# Patient Record
Sex: FEMALE | Race: BLACK OR AFRICAN AMERICAN | NOT HISPANIC OR LATINO | URBAN - METROPOLITAN AREA
[De-identification: names, ages, dates, MRNs, and addresses within clinical notes are randomized per-mention and may not be internally consistent; named-entity substitution may affect disease eponyms.]

---

## 2018-05-17 ENCOUNTER — EMERGENCY (EMERGENCY)
Facility: HOSPITAL | Age: 28
LOS: 1 days | Discharge: HOME | End: 2018-05-17
Attending: EMERGENCY MEDICINE | Admitting: EMERGENCY MEDICINE
Payer: COMMERCIAL

## 2018-05-17 VITALS
RESPIRATION RATE: 16 BRPM | OXYGEN SATURATION: 100 % | TEMPERATURE: 99 F | WEIGHT: 154.98 LBS | DIASTOLIC BLOOD PRESSURE: 94 MMHG | HEART RATE: 103 BPM | HEIGHT: 62 IN | SYSTOLIC BLOOD PRESSURE: 116 MMHG

## 2018-05-17 VITALS
SYSTOLIC BLOOD PRESSURE: 122 MMHG | OXYGEN SATURATION: 100 % | TEMPERATURE: 98 F | RESPIRATION RATE: 16 BRPM | HEART RATE: 88 BPM

## 2018-05-17 PROCEDURE — 70486 CT MAXILLOFACIAL W/O DYE: CPT | Mod: 26

## 2018-05-17 PROCEDURE — 99284 EMERGENCY DEPT VISIT MOD MDM: CPT

## 2018-05-17 PROCEDURE — 99285 EMERGENCY DEPT VISIT HI MDM: CPT | Mod: 25

## 2018-05-17 PROCEDURE — 70486 CT MAXILLOFACIAL W/O DYE: CPT

## 2018-05-17 RX ORDER — IBUPROFEN 200 MG
600 TABLET ORAL ONCE
Qty: 0 | Refills: 0 | Status: COMPLETED | OUTPATIENT
Start: 2018-05-17 | End: 2018-05-17

## 2018-05-17 RX ADMIN — Medication 600 MILLIGRAM(S): at 19:59

## 2018-05-17 RX ADMIN — Medication 600 MILLIGRAM(S): at 20:15
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
## 2018-05-17 NOTE — ED PROVIDER NOTE - SKIN, MLM
Skin normal color for race, warm, dry. Indurated area over lateral portion of L upper arm and small scratch to R hand.

## 2018-05-17 NOTE — ED PROVIDER NOTE - NS_ ATTENDINGSCRIBEDETAILS _ED_A_ED_FT
Amadeo Castelan MD - The scribe's documentation has been prepared under my direction and personally reviewed by me in its entirety. I confirm that the note above accurately reflects all work, treatment, procedures, and medical decision making performed by me.

## 2018-05-17 NOTE — ED PROVIDER NOTE - ENMT, MLM
Airway patent, Nasal mucosa clear. Mouth with normal mucosa. Throat has no vesicles, no oropharyngeal exudates and uvula is midline.  Facial exam: Bilateral erythema and swelling to maxilla with + ttp. + ttp bilateral TMJ, FROM intact.
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
## 2018-05-17 NOTE — ED ADULT NURSE REASSESSMENT NOTE - NS ED NURSE REASSESS COMMENT FT1
Pt referred to Nursing Supervisor Ade and FAIZAN Prieto. Pt reported will go home  safely with her Mom's sister house
Pt seen and examined by Dr Castelan

## 2018-05-18 RX ORDER — ETONOGESTREL 68 MG/1
1 IMPLANT SUBCUTANEOUS
Qty: 0 | Refills: 0 | COMMUNITY

## 2019-09-12 NOTE — ED PROVIDER NOTE - HISTORY ATTESTATION, MLM
May put as much weight as you can tolerate on operative leg unless told otherwise    CPM 3-4 hours per day, increase ROM as tolerated (usually increase by 5 degrees at a time)    Incision may get wet, uncovered in shower, as of post operative day 2; NO BATHS    Incision may be open to the air, unless there is some drainage, then use a dry dressing and change daily or more if needed    TEDS (white stockings) should be worn for a total of 2 weeks, they can however, be removed at night    You should have a follow up appointment about 2 weeks after surgery. At this visit your sutures/staples will be removed. Please take pain medication prior to this appointment. If you don't have an appointment please call the clinic to make one    Aspirin 325 mg twice daily for 6 weeks, for total of 650 mg daily    Participate in therapy    Ice to surgical knee, 20 minutes 3-4 times per day; may put heat behind knee for muscle spasms and to help with stretching exercises    If you need a refill of your pain medication, please call before you completely run out and prior to the weekend. NO narcotics will be filled on the weekend    You will need to take antibiotics one hour prior to any dental work, for the rest of your life    Contact numbers:  [place]  [phone]  [place]  [phone]  [place]  [phone]  Call Service  (24 hours per day) [phone]    HOLD METOPROLOL UNTIL YOU ARE SEN BY BERNY FLORES, YOUR PCP     I have reviewed and confirmed nurses' notes...

## 2023-01-01 NOTE — ED PROVIDER NOTE - NS_EDPROVIDERDISPOUSERTYPE_ED_A_ED
Scribe Attestation (For Scribes USE Only)... Attending Attestation (For Attendings USE Only).../Scribe Attestation (For Scribes USE Only)... 19.48

## 2023-01-25 NOTE — ED ADULT NURSE NOTE - MODE OF DISCHARGE
[Right] : of the right [Alcohol] : alcohol [Betadine] : betadine [Ethyl Chloride sprayed topically] : ethyl chloride sprayed topically [Sterile technique used] : sterile technique used [Euflexxa(20mg)] : 20mg of Euflexxa [#1] : series #1 Ambulatory